# Patient Record
Sex: FEMALE | Race: WHITE | NOT HISPANIC OR LATINO
[De-identification: names, ages, dates, MRNs, and addresses within clinical notes are randomized per-mention and may not be internally consistent; named-entity substitution may affect disease eponyms.]

---

## 2018-03-23 ENCOUNTER — APPOINTMENT (OUTPATIENT)
Dept: NEUROSURGERY | Facility: CLINIC | Age: 43
End: 2018-03-23

## 2018-05-25 ENCOUNTER — APPOINTMENT (OUTPATIENT)
Dept: NEUROSURGERY | Facility: CLINIC | Age: 43
End: 2018-05-25
Payer: COMMERCIAL

## 2018-05-25 VITALS
TEMPERATURE: 98.5 F | WEIGHT: 170 LBS | BODY MASS INDEX: 29.02 KG/M2 | HEIGHT: 64 IN | SYSTOLIC BLOOD PRESSURE: 111 MMHG | OXYGEN SATURATION: 96 % | DIASTOLIC BLOOD PRESSURE: 74 MMHG | HEART RATE: 73 BPM

## 2018-05-25 DIAGNOSIS — R32 UNSPECIFIED URINARY INCONTINENCE: ICD-10-CM

## 2018-05-25 DIAGNOSIS — K21.9 GASTRO-ESOPHAGEAL REFLUX DISEASE W/OUT ESOPHAGITIS: ICD-10-CM

## 2018-05-25 DIAGNOSIS — Z78.9 OTHER SPECIFIED HEALTH STATUS: ICD-10-CM

## 2018-05-25 DIAGNOSIS — G47.30 SLEEP APNEA, UNSPECIFIED: ICD-10-CM

## 2018-05-25 DIAGNOSIS — Z83.42 FAMILY HISTORY OF FAMILIAL HYPERCHOLESTEROLEMIA: ICD-10-CM

## 2018-05-25 DIAGNOSIS — G47.33 OBSTRUCTIVE SLEEP APNEA (ADULT) (PEDIATRIC): ICD-10-CM

## 2018-05-25 DIAGNOSIS — Z99.89 OBSTRUCTIVE SLEEP APNEA (ADULT) (PEDIATRIC): ICD-10-CM

## 2018-05-25 DIAGNOSIS — Z80.1 FAMILY HISTORY OF MALIGNANT NEOPLASM OF TRACHEA, BRONCHUS AND LUNG: ICD-10-CM

## 2018-05-25 DIAGNOSIS — Z83.3 FAMILY HISTORY OF DIABETES MELLITUS: ICD-10-CM

## 2018-05-25 PROCEDURE — 99204 OFFICE O/P NEW MOD 45 MIN: CPT

## 2018-05-25 RX ORDER — OMEPRAZOLE 40 MG/1
40 CAPSULE, DELAYED RELEASE ORAL DAILY
Refills: 0 | Status: ACTIVE | COMMUNITY

## 2018-05-25 RX ORDER — SOLIFENACIN SUCCINATE 10 MG/1
10 TABLET, FILM COATED ORAL DAILY
Refills: 0 | Status: ACTIVE | COMMUNITY

## 2018-06-12 ENCOUNTER — APPOINTMENT (OUTPATIENT)
Dept: CT IMAGING | Facility: CLINIC | Age: 43
End: 2018-06-12

## 2018-06-28 ENCOUNTER — OUTPATIENT (OUTPATIENT)
Dept: OUTPATIENT SERVICES | Facility: HOSPITAL | Age: 43
LOS: 1 days | End: 2018-06-28
Payer: COMMERCIAL

## 2018-06-28 ENCOUNTER — APPOINTMENT (OUTPATIENT)
Dept: CT IMAGING | Facility: CLINIC | Age: 43
End: 2018-06-28
Payer: COMMERCIAL

## 2018-06-28 ENCOUNTER — TRANSCRIPTION ENCOUNTER (OUTPATIENT)
Age: 43
End: 2018-06-28

## 2018-06-28 DIAGNOSIS — M54.12 RADICULOPATHY, CERVICAL REGION: ICD-10-CM

## 2018-06-28 DIAGNOSIS — R20.0 ANESTHESIA OF SKIN: ICD-10-CM

## 2018-06-28 PROCEDURE — 72125 CT NECK SPINE W/O DYE: CPT | Mod: 26

## 2018-06-28 PROCEDURE — 72125 CT NECK SPINE W/O DYE: CPT

## 2018-06-28 PROCEDURE — 76376 3D RENDER W/INTRP POSTPROCES: CPT

## 2018-06-28 PROCEDURE — 76376 3D RENDER W/INTRP POSTPROCES: CPT | Mod: 26

## 2018-09-07 ENCOUNTER — APPOINTMENT (OUTPATIENT)
Dept: RADIOLOGY | Facility: HOSPITAL | Age: 43
End: 2018-09-07

## 2018-09-07 ENCOUNTER — OUTPATIENT (OUTPATIENT)
Dept: OUTPATIENT SERVICES | Facility: HOSPITAL | Age: 43
LOS: 1 days | End: 2018-09-07
Payer: COMMERCIAL

## 2018-09-07 DIAGNOSIS — M54.12 RADICULOPATHY, CERVICAL REGION: ICD-10-CM

## 2018-09-07 PROCEDURE — 62302 MYELOGRAPHY LUMBAR INJECTION: CPT

## 2018-09-07 PROCEDURE — 72126 CT NECK SPINE W/DYE: CPT | Mod: 26

## 2018-09-07 PROCEDURE — 72126 CT NECK SPINE W/DYE: CPT

## 2018-09-28 ENCOUNTER — APPOINTMENT (OUTPATIENT)
Dept: NEUROSURGERY | Facility: CLINIC | Age: 43
End: 2018-09-28
Payer: COMMERCIAL

## 2018-09-28 VITALS
WEIGHT: 170 LBS | DIASTOLIC BLOOD PRESSURE: 74 MMHG | OXYGEN SATURATION: 97 % | HEART RATE: 62 BPM | SYSTOLIC BLOOD PRESSURE: 116 MMHG | RESPIRATION RATE: 17 BRPM | BODY MASS INDEX: 29.02 KG/M2 | HEIGHT: 64 IN

## 2018-09-28 DIAGNOSIS — R20.2 ANESTHESIA OF SKIN: ICD-10-CM

## 2018-09-28 DIAGNOSIS — R20.0 ANESTHESIA OF SKIN: ICD-10-CM

## 2018-09-28 PROCEDURE — 99214 OFFICE O/P EST MOD 30 MIN: CPT

## 2018-09-28 RX ORDER — TRAMADOL HYDROCHLORIDE 50 MG/1
50 TABLET, COATED ORAL 3 TIMES DAILY
Qty: 30 | Refills: 0 | Status: ACTIVE | COMMUNITY
Start: 2018-09-28 | End: 1900-01-01

## 2018-09-28 RX ORDER — TRAMADOL HYDROCHLORIDE 50 MG/1
50 TABLET, COATED ORAL 3 TIMES DAILY
Refills: 0 | Status: DISCONTINUED | COMMUNITY
Start: 2018-09-28 | End: 2018-09-28

## 2019-01-15 ENCOUNTER — RECORD ABSTRACTING (OUTPATIENT)
Age: 44
End: 2019-01-15

## 2019-01-18 ENCOUNTER — APPOINTMENT (OUTPATIENT)
Dept: NEUROSURGERY | Facility: CLINIC | Age: 44
End: 2019-01-18
Payer: COMMERCIAL

## 2019-01-24 ENCOUNTER — APPOINTMENT (OUTPATIENT)
Dept: NEUROLOGY | Facility: CLINIC | Age: 44
End: 2019-01-24
Payer: COMMERCIAL

## 2019-01-24 PROCEDURE — 95911 NRV CNDJ TEST 9-10 STUDIES: CPT

## 2019-01-24 PROCEDURE — 95886 MUSC TEST DONE W/N TEST COMP: CPT

## 2019-02-04 ENCOUNTER — APPOINTMENT (OUTPATIENT)
Dept: NEUROSURGERY | Facility: CLINIC | Age: 44
End: 2019-02-04
Payer: COMMERCIAL

## 2019-02-15 ENCOUNTER — APPOINTMENT (OUTPATIENT)
Dept: NEUROSURGERY | Facility: CLINIC | Age: 44
End: 2019-02-15
Payer: COMMERCIAL

## 2019-02-15 VITALS
SYSTOLIC BLOOD PRESSURE: 115 MMHG | HEIGHT: 64 IN | WEIGHT: 170 LBS | DIASTOLIC BLOOD PRESSURE: 75 MMHG | TEMPERATURE: 98.3 F | RESPIRATION RATE: 17 BRPM | HEART RATE: 59 BPM | BODY MASS INDEX: 29.02 KG/M2 | OXYGEN SATURATION: 98 %

## 2019-02-15 DIAGNOSIS — M79.601 PAIN IN RIGHT ARM: ICD-10-CM

## 2019-02-15 DIAGNOSIS — M54.12 RADICULOPATHY, CERVICAL REGION: ICD-10-CM

## 2019-02-15 DIAGNOSIS — R20.0 PAIN IN RIGHT ARM: ICD-10-CM

## 2019-02-15 PROCEDURE — 99213 OFFICE O/P EST LOW 20 MIN: CPT

## 2019-02-15 RX ORDER — PREGABALIN 50 MG/1
50 CAPSULE ORAL TWICE DAILY
Refills: 0 | Status: ACTIVE | COMMUNITY
Start: 2019-02-15

## 2019-02-15 RX ORDER — LIDOCAINE 5% 700 MG/1
5 PATCH TOPICAL
Refills: 0 | Status: DISCONTINUED | COMMUNITY
End: 2019-02-15

## 2019-02-15 NOTE — PHYSICAL EXAM
[FreeTextEntry1] : Awake, alert, and oriented x 3. Speech is clear and appropriate.  Affect is normal.  Voice is strong.  Respirations easy and even.  Short and long term memory intact.  Attention span and concentration intact.  Language fluency, comprehension, and reading intact.  Fund of knowledge intact.  Normal skin color and pigmentation.  The sclera and conjunctiva normal.  Ears, nose, and neck normal in appearance. facial sensation intact to forehead only, Right hand dominant.  Right anterior cervical incision well healed.\par \par Range of motion cervical spine decreased and painful in all directions.  Pain to palpation in the cervical and paracervical regions bilaterally with moderate spasm palpated.  Neg. Goodson’s sign bilaterally.  Able to perform tandem walk without loss of balance.  Motor strength in the upper extremities 5/5 in the biceps, triceps, brachioradialis, hand , hand intrinsics, finger extension, finger abduction, wrist extension, EPL, infraspinatus, and supraspinatus.  Joint position sense intact upper and lower extremities.  Reflexes in the upper extremities are 1+ in biceps, triceps, and brachioradialis bilaterally.  Sensation to pin prick decreased in the upper extremities, trunk, and below the knee.  Normal sensation to pin prick in the forehead and upper legs.      \par \par Rises from a seated position in a fluent and comfortable fashion.  Gait is alternating, well coordinated, and stable without the use of an assistive device.  Able to toe and heel walk without difficulty.  Motor strength in the lower extremities is 5/5 in the iliopsoas, gluteus medius, quadriceps, and hamstrings.\par \par \par
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
## 2019-02-15 NOTE — REVIEW OF SYSTEMS
[Hand Weakness] :  hand weakness [Numbness] : numbness [Incontinence] : incontinence [Muscle Weakness] : muscle weakness [Negative] : Heme/Lymph

## 2019-02-15 NOTE — ASSESSMENT
[FreeTextEntry1] : Mrs. Calderon returns to the office after seeing pain management and having EMG UE. EMG's were normal.  Dr. Huerta has been doing TPI which has been helpful (has had one set of injections so far).  Will continue with pain management, PT, and Lyrica.  She will follow-up in the office in 3-4 months for further clinical evaluation.\par

## 2020-02-28 ENCOUNTER — APPOINTMENT (OUTPATIENT)
Dept: NEUROSURGERY | Facility: CLINIC | Age: 45
End: 2020-02-28

## 2023-06-27 ENCOUNTER — APPOINTMENT (OUTPATIENT)
Dept: UROGYNECOLOGY | Facility: CLINIC | Age: 48
End: 2023-06-27
Payer: COMMERCIAL

## 2023-06-27 VITALS
OXYGEN SATURATION: 98 % | SYSTOLIC BLOOD PRESSURE: 109 MMHG | HEIGHT: 64 IN | WEIGHT: 185 LBS | DIASTOLIC BLOOD PRESSURE: 71 MMHG | HEART RATE: 63 BPM | TEMPERATURE: 96.7 F | BODY MASS INDEX: 31.58 KG/M2

## 2023-06-27 DIAGNOSIS — R35.0 FREQUENCY OF MICTURITION: ICD-10-CM

## 2023-06-27 PROCEDURE — 99205 OFFICE O/P NEW HI 60 MIN: CPT | Mod: 25

## 2023-06-27 PROCEDURE — 51701 INSERT BLADDER CATHETER: CPT

## 2023-06-27 RX ORDER — TROSPIUM CHLORIDE 60 MG/1
60 CAPSULE, EXTENDED RELEASE ORAL
Qty: 30 | Refills: 3 | Status: ACTIVE | COMMUNITY
Start: 2023-06-27 | End: 1900-01-01

## 2023-06-27 NOTE — PROCEDURE
[Straight Catheterization] : insertion of a straight catheter [Urinary Tract Infection] : a urinary tract infection [___ Fr Straight Tip] : a [unfilled] in [place] straight tip catheter [None] : there were no complications with the catheter insertion [Clear] : clear [Culture] : culture [Stress Incontinence] : stress incontinence [Urgent Incontinence] : urgent incontinence

## 2023-06-27 NOTE — REASON FOR VISIT
[Initial Visit ___] : an initial visit for [unfilled] [Urinary Incontinence] : urinary incontinence [Vesicare] : Vesicare
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
## 2023-06-27 NOTE — LETTER BODY
[Dear  ___] : Dear  [unfilled], [I had the pleasure of evaluating your patient, [unfilled]. Thank you for referring Ms. [unfilled] for consultation for ___] : I had the pleasure of evaluating your patient, [unfilled]. Thank you for referring Ms. [unfilled] for consultation for [unfilled]. [Attached please find my note.] : Attached please find my note. [Thank you very much for allowing me to participate in the care of this patient. If you have any questions, please do not hesitate to contact me] : Thank you very much for allowing me to participate in the care of this patient. If you have any questions, please do not hesitate to contact me. [FreeTextEntry1] : overactive bladder, stress incontinence.

## 2023-06-27 NOTE — PHYSICAL EXAM
[Chaperone Present] : A chaperone was present in the examining room during all aspects of the physical examination [Labia Majora] : were normal [Labia Minora] : were normal [Normal Appearance] : general appearance was normal [No Bleeding] : there was no active vaginal bleeding [Normal] : no abnormalities [Exam Deferred] : was deferred [FreeTextEntry1] : General: Well, appearing. Alert and orientated. No acute distress\par HEENT: Normocephalic, atraumatic and extraocular muscles appear to be intact \par Neck: Full range of motion, no obvious lymphadenopathy, deformities, or masses noted \par Respiratory: Speaking in full sentences comfortably, normal work of breathing and no cough during visit\par Musculoskeletal: full range of motion \par Extremities: No upper extremity edema noted\par Skin: no obvious rash or skin lesions\par Neuro: Orientated X 3, speech is fluent, normal rate\par Psych: Normal mood and affect  [Tenderness] : ~T no ~M abdominal tenderness observed [Distended] : not distended [2] : 2 [Aa ____] : Aa [unfilled] [Ba ____] : Ba [unfilled] [C ____] : C [unfilled] [GH ____] : GH [unfilled] [PB ____] : PB [unfilled] [TVL ____] : TVL  [unfilled] [Ap ____] : Ap [unfilled] [Bp ____] : Bp [unfilled] [D ____] : D [unfilled] [de-identified] : 2-3cm fluctuant right Bartholin's cyst, no discharged expressed, mobile, nontender  [FreeTextEntry3] : (+) hypermobility, (+) cough stress test

## 2023-06-27 NOTE — DISCUSSION/SUMMARY
[FreeTextEntry1] : Images were used to review the findings of Stage 2 anterior and posterior wall prolapse with the patient. Treatment options for the prolapse were discussed and included observation, pelvic floor exercises with or without physical therapy, a pessary, or surgical correction. Surgically we discussed an anterior and posterior vaginal repair. We also discussed the perioperative course and reviewed postoperative restrictions of complete pelvic rest and avoidance of strenuous exercise/heavy lifting (>10lb) for 6 weeks. \par \par We also discussed the findings of a Bartholin's cyst and its pathophysiology. We reviewed treatment options such as conservative management with warm compresses, sitz bath's to allow for spontaneous drainage.  We also discussed performing incision and drainage, or needle aspiration if it becomes painful or symptomatic, with the high likelihood of recurrence.  In addition we discussed marsupialization or excision of the cyst.  She is not very bothered by it currently and will start with conservative management.\par \par We reviewed her urinary symptoms and exam findings and discussed possible etiologies including urinary tract infection, overactive bladder/urgency incontinence, stress incontinence.  Cath urine specimen was sent for urine culture today. We discussed management options for overactive bladder including observation, fluid and behavioral modifications, bladder training, physical therapy and medications including anticholinergics and beta 3 agonists. We discussed that there are many overactive bladder medications; however, none have been clearly shown to be superior to the others and they differ in their side effects. Side effects include dry eye, dry mouth, constipation, hypertension, dizziness, and memory problems. We discussed that we may need to trial a few medications to find one that is effective at controlling her symptoms with an acceptable side effect profile. We discussed proceeding with urodynamics or additional testing to further evaluate her symptoms if behavioral modifications and medications fail. We discussed third line treatment options including sacral neuromodulation, PTNS and intra detrusor Botox. \par \par Lastly, we reviewed management options for stress urinary incontinence including: observation, pelvic floor exercises with or without a physical therapist, continence devices or pessaries, periurethral bulking agents, and surgical management. We discussed surgical management options including a midurethral sling, vázquez colposuspension, and pubovaginal sling using native tissue. We reviewed risks and benefits of each procedure.\par \par At this time, she wants to avoid any type of surgery.  She will start with conservative management for her Bartholin cyst.  She will do pelvic floor exercises with physical therapy for her prolapse and stress incontinence.  For her overactive bladder/urgency incontinence she will start with fluid and behavioral modifications, bladder training and wishes to start a medication.  A prescription for trospium was sent to her pharmacy.  We discussed that it may take several weeks before we start to see effects from the fluid and behavioral modifications and that it can also take a few weeks to see full effects from trospium.\par \par [facility] handout on POP, OAB, bladder training, BERNICE was given to her. RTO 3 months for OAB follow up

## 2023-06-27 NOTE — REVIEW OF SYSTEMS
Final Anesthesia Post-op Assessment    Patient: Milton Constantino  Procedure(s) Performed: LEFT EXTRACTION, CATARACT, WITH IOL INSERTION - LEFT  Anesthesia type: MAC    Vitals Value Taken Time   Temp 36.7 °C (98 °F) 02/01/21 1110   Pulse 60 02/01/21 1110   Resp 16 02/01/21 1110   SpO2 100 % 02/01/21 1110   /54 02/01/21 1110         Patient Location: Phase II  Post-op Vital Signs:stable  Level of Consciousness: participates in exam, awake, alert and oriented  Respiratory Status: spontaneous ventilation and unassisted  Cardiovascular blood pressure returned to baseline and stable  Hydration: euvolemic  Pain Management: well controlled  Handoff: Handoff to receiving clinician was performed and questions were answered  Vomiting: none   Nausea: None  Airway Patency:patent  Post-op Assessment: awake, alert, appropriately conversant, or baseline, no complications and patient tolerated procedure well with no complications  Comments: SV, VSS, no pt complaints of N/V or pain. Report to RN.      No complications documented.    [All Other ROS] : all other reviewed systems are negative

## 2023-06-30 LAB — BACTERIA UR CULT: NORMAL

## 2023-12-05 ENCOUNTER — APPOINTMENT (OUTPATIENT)
Dept: UROGYNECOLOGY | Facility: CLINIC | Age: 48
End: 2023-12-05
Payer: COMMERCIAL

## 2023-12-05 VITALS
TEMPERATURE: 98.3 F | SYSTOLIC BLOOD PRESSURE: 113 MMHG | HEIGHT: 64 IN | WEIGHT: 182 LBS | DIASTOLIC BLOOD PRESSURE: 72 MMHG | BODY MASS INDEX: 31.07 KG/M2 | HEART RATE: 64 BPM | OXYGEN SATURATION: 97 %

## 2023-12-05 DIAGNOSIS — N32.81 OVERACTIVE BLADDER: ICD-10-CM

## 2023-12-05 DIAGNOSIS — N39.41 URGE INCONTINENCE: ICD-10-CM

## 2023-12-05 DIAGNOSIS — N39.3 STRESS INCONTINENCE (FEMALE) (MALE): ICD-10-CM

## 2023-12-05 DIAGNOSIS — R39.15 URGENCY OF URINATION: ICD-10-CM

## 2023-12-05 DIAGNOSIS — N81.6 RECTOCELE: ICD-10-CM

## 2023-12-05 DIAGNOSIS — N81.11 CYSTOCELE, MIDLINE: ICD-10-CM

## 2023-12-05 PROCEDURE — 99214 OFFICE O/P EST MOD 30 MIN: CPT

## 2024-04-30 ENCOUNTER — APPOINTMENT (OUTPATIENT)
Dept: UROGYNECOLOGY | Facility: CLINIC | Age: 49
End: 2024-04-30

## 2024-08-09 ENCOUNTER — APPOINTMENT (OUTPATIENT)
Dept: UROGYNECOLOGY | Facility: CLINIC | Age: 49
End: 2024-08-09